# Patient Record
Sex: MALE | Race: WHITE | NOT HISPANIC OR LATINO | Employment: FULL TIME | URBAN - METROPOLITAN AREA
[De-identification: names, ages, dates, MRNs, and addresses within clinical notes are randomized per-mention and may not be internally consistent; named-entity substitution may affect disease eponyms.]

---

## 2017-01-09 ENCOUNTER — OFFICE VISIT (OUTPATIENT)
Dept: FAMILY MEDICINE CLINIC | Facility: CLINIC | Age: 52
End: 2017-01-09

## 2017-01-09 VITALS
HEART RATE: 71 BPM | RESPIRATION RATE: 16 BRPM | TEMPERATURE: 98 F | OXYGEN SATURATION: 98 % | SYSTOLIC BLOOD PRESSURE: 150 MMHG | BODY MASS INDEX: 33.15 KG/M2 | WEIGHT: 266.6 LBS | HEIGHT: 75 IN | DIASTOLIC BLOOD PRESSURE: 94 MMHG

## 2017-01-09 DIAGNOSIS — I10 BENIGN ESSENTIAL HYPERTENSION: ICD-10-CM

## 2017-01-09 DIAGNOSIS — R73.9 HYPERGLYCEMIA: Primary | ICD-10-CM

## 2017-01-09 DIAGNOSIS — E11.65 TYPE 2 DIABETES MELLITUS WITH HYPERGLYCEMIA, WITHOUT LONG-TERM CURRENT USE OF INSULIN (HCC): ICD-10-CM

## 2017-01-09 LAB — HBA1C MFR BLD: 12.2 %

## 2017-01-09 PROCEDURE — 83036 HEMOGLOBIN GLYCOSYLATED A1C: CPT | Performed by: INTERNAL MEDICINE

## 2017-01-09 PROCEDURE — 99213 OFFICE O/P EST LOW 20 MIN: CPT | Performed by: INTERNAL MEDICINE

## 2017-01-09 RX ORDER — CARVEDILOL 12.5 MG/1
12.5 TABLET ORAL 2 TIMES DAILY WITH MEALS
Qty: 60 TABLET | Refills: 11 | Status: SHIPPED | OUTPATIENT
Start: 2017-01-09 | End: 2017-06-26 | Stop reason: HOSPADM

## 2017-01-09 NOTE — MR AVS SNAPSHOT
Jona ROJAS Ela   1/9/2017 8:30 AM   Office Visit    Dept Phone:  [phone]   Encounter #:  [id number]    Provider:  Elias Almendarez MD   Department:  [facility]                Your Full Care Plan              Today's Medication Changes          These changes are accurate as of: 1/9/17  9:24 AM.  If you have any questions, ask your nurse or doctor.               New Medication(s)Ordered:     Dulaglutide 1.5 MG/0.5ML solution pen-injector   Inject 1.5 mg under the skin 1 (One) Time Per Week.   Started by:  Elias Almendarez MD       SITagliptin-MetFORMIN HCl -1000 MG tablet sustained-release 24 hour   Commonly known as:  JANUMET XR   one tablet daily   Started by:  Elias Almendarez MD         Medication(s)that have changed:     carvedilol 12.5 MG tablet   Commonly known as:  COREG   Take 1 tablet by mouth 2 (Two) Times a Day With Meals.   What changed:  See the new instructions.   Changed by:  Elias Almendarez MD            Where to Get Your Medications      These medications were sent to [place] [id number] [facility] [phone] [facility] [phone] [address] [address]     Phone:  [phone]     carvedilol 12.5 MG tablet    Dulaglutide 1.5 MG/0.5ML solution pen-injector    SITagliptin-MetFORMIN HCl -1000 MG tablet sustained-release 24 hour                  Your Updated Medication List          This list is accurate as of: 1/9/17  9:24 AM.  Always use your most recent med list.                amLODIPine 5 MG tablet   Commonly known as:  NORVASC   Take 1 tablet by mouth Daily.       atorvastatin 10 MG tablet   Commonly known as:  LIPITOR       carvedilol 12.5 MG tablet   Commonly known as:  COREG   Take 1 tablet by mouth 2 (Two) Times a Day With Meals.       Dulaglutide 1.5 MG/0.5ML solution pen-injector   Inject 1.5 mg under the skin 1 (One) Time Per  Week.       FLUVIRIN 0.5 ML suspension prefilled syringe injection   Generic drug:  Influenza Vac Typ A&B Surf Ant       hydrOXYzine 25 MG tablet   Commonly known as:  ATARAX   Take 1 tablet by mouth every 6 (six) hours as needed for itching.       lisinopril-hydrochlorothiazide 20-25 MG per tablet   Commonly known as:  PRINZIDE,ZESTORETIC   TAKE ONE TABLET BY MOUTH ONCE DAILY       SITagliptin-MetFORMIN HCl -1000 MG tablet sustained-release 24 hour   Commonly known as:  JANUMET XR   one tablet daily       Testosterone 20.25 MG/ACT (1.62%) gel   Commonly known as:  ANDROGEL PUMP   APPLY FOUR PUMPS TO THE SKIN ONCE DAILY AS DIRECTED               We Performed the Following     POC Glycosylated Hemoglobin (Hb A1C)       You Were Diagnosed With        Codes Comments    Hyperglycemia    -  Primary ICD-10-CM: R73.9  ICD-9-CM: 790.29       Instructions     None    Patient Instructions History      Upcoming Appointments     Visit Type Date Time Department    OFFICE VISIT 1/9/2017  8:30 AM [facility]      [employer] Signup     Our records indicate that you have an active [employer] account.    You can view your After Visit Summary by going to [email or web address] and logging in with your [employer] username and password.  If you don't have a [employer] username and password but a parent or guardian has access to your record, the parent or guardian should login with their own [employer] username and password and access your record to view the After Visit Summary.    If you have questions, you can email [email or web address] or call [phone] to talk to our [employer] staff.  Remember, [employer] is NOT to be used for urgent needs.  For medical emergencies, dial 911.               Other Info from Your Visit           Allergies     No Known Allergies      Reason for Visit     Hypogonadism 1 month f/u       Vital Signs     Blood Pressure Pulse Temperature Respirations Height Weight    150/94 71 98 °F (36.7  "°C) 16 75\" (190.5 cm) 266 lb 9.6 oz (121 kg)    Oxygen Saturation Body Mass Index Smoking Status             98% 33.32 kg/m2 Never Smoker         Problems and Diagnoses Noted     Hyperglycemia    -  Primary      Results     POC Glycosylated Hemoglobin (Hb A1C)      Component Value Standard Range & Units    Hemoglobin A1C 12.2 %                    "

## 2017-01-09 NOTE — PROGRESS NOTES
Subjective   Jona Mahmood is a 51 y.o. male. Patient is here today for   Chief Complaint   Patient presents with   • Hypogonadism     1 month f/u           Vitals:    01/09/17 0827   BP: 150/94   Pulse: 71   Resp: 16   Temp: 98 °F (36.7 °C)   SpO2: 98%       Past Medical History   Diagnosis Date   • Hyperglycemia    • Hypertension    • Low testosterone       No Known Allergies   Social History     Social History   • Marital status:      Spouse name: N/A   • Number of children: N/A   • Years of education: N/A     Occupational History   • Not on file.     Social History Main Topics   • Smoking status: Never Smoker   • Smokeless tobacco: Not on file   • Alcohol use No   • Drug use: Not on file   • Sexual activity: Not on file     Other Topics Concern   • Not on file     Social History Narrative        Current Outpatient Prescriptions:   •  amLODIPine (NORVASC) 5 MG tablet, Take 1 tablet by mouth Daily., Disp: 30 tablet, Rfl: 5  •  atorvastatin (LIPITOR) 10 MG tablet, Take 1 tablet by mouth daily., Disp: , Rfl:   •  FLUVIRIN 0.5 ML suspension prefilled syringe injection, , Disp: , Rfl:   •  hydrOXYzine (ATARAX) 25 MG tablet, Take 1 tablet by mouth every 6 (six) hours as needed for itching., Disp: 40 tablet, Rfl: 0  •  lisinopril-hydrochlorothiazide (PRINZIDE,ZESTORETIC) 20-25 MG per tablet, TAKE ONE TABLET BY MOUTH ONCE DAILY, Disp: 90 tablet, Rfl: 0  •  Testosterone (ANDROGEL PUMP) 20.25 MG/ACT (1.62%) gel, APPLY FOUR PUMPS TO THE SKIN ONCE DAILY AS DIRECTED, Disp: 75 g, Rfl: 1  •  carvedilol (COREG) 12.5 MG tablet, Take 1 tablet by mouth 2 (Two) Times a Day With Meals., Disp: 60 tablet, Rfl: 11  •  Dulaglutide 1.5 MG/0.5ML solution pen-injector, Inject 1.5 mg under the skin 1 (One) Time Per Week., Disp: 4 pen, Rfl: 11  •  SITagliptin-MetFORMIN HCl ER (JANUMET XR) 100-1000 MG tablet sustained-release 24 hour, one tablet daily, Disp: 30 tablet, Rfl: 11     Objective     HPI Comments: He is here to follow-up on  his type 2 diabetes.  He has been taking Janumet 100/1000 once daily since a saw him last.    He tells me that he feels pretty well.  He denies polyuria polydipsia or visual changes.       Review of Systems   Constitutional: Negative.    HENT: Negative.    Respiratory: Negative.    Cardiovascular: Negative.    Genitourinary: Negative for frequency and urgency.   Psychiatric/Behavioral: Negative.        Physical Exam   Constitutional: He is oriented to person, place, and time.   HENT:   Head: Normocephalic and atraumatic.   Pulmonary/Chest: Effort normal.   Neurological: He is alert and oriented to person, place, and time.   Psychiatric: He has a normal mood and affect. His behavior is normal.   Nursing note and vitals reviewed.        Problem List Items Addressed This Visit        Cardiovascular and Mediastinum    Benign essential hypertension    Relevant Medications    carvedilol (COREG) 12.5 MG tablet       Endocrine    Type 2 diabetes mellitus    Relevant Medications    Dulaglutide 1.5 MG/0.5ML solution pen-injector    SITagliptin-MetFORMIN HCl ER (JANUMET XR) 100-1000 MG tablet sustained-release 24 hour      Other Visit Diagnoses     Hyperglycemia    -  Primary    Relevant Orders    POC Glycosylated Hemoglobin (Hb A1C) (Completed)            PLAN  His hemoglobin A1c today is more than 12 which is a slight increase over a hemoglobin A1c done last month.  This, and spite of taking Janumet 100/1000 once daily.    I asked him to start taking 2 was 71.5 mg subcutaneous every weekly.    I like to see him back in about one month.  I would check a hemoglobin A1c at that point.    I may be sending him off to see an endocrinologist before I started him on insulin.  The patient and I discussed referral to endocrinology.  I asked him to wait a month to see what sort of progress he makes in the meantime.  Next    His hypertension is poorly controlled.  I asked him to start taking carvedilol 12.5 mg one by mouth twice a  day.  Return in about 4 weeks (around 2/6/2017).

## 2017-02-07 ENCOUNTER — OFFICE VISIT (OUTPATIENT)
Dept: FAMILY MEDICINE CLINIC | Facility: CLINIC | Age: 52
End: 2017-02-07

## 2017-02-07 VITALS
BODY MASS INDEX: 32.58 KG/M2 | SYSTOLIC BLOOD PRESSURE: 122 MMHG | DIASTOLIC BLOOD PRESSURE: 80 MMHG | TEMPERATURE: 98.3 F | RESPIRATION RATE: 16 BRPM | OXYGEN SATURATION: 98 % | HEART RATE: 74 BPM | HEIGHT: 75 IN | WEIGHT: 262 LBS

## 2017-02-07 DIAGNOSIS — E11.65 TYPE 2 DIABETES MELLITUS WITH HYPERGLYCEMIA, WITHOUT LONG-TERM CURRENT USE OF INSULIN (HCC): Primary | ICD-10-CM

## 2017-02-07 DIAGNOSIS — I10 BENIGN ESSENTIAL HYPERTENSION: ICD-10-CM

## 2017-02-07 PROCEDURE — 99213 OFFICE O/P EST LOW 20 MIN: CPT | Performed by: INTERNAL MEDICINE

## 2017-02-07 NOTE — PROGRESS NOTES
Subjective   Jona Mahmood is a 52 y.o. male. Patient is here today for   Chief Complaint   Patient presents with   • Hypertension     f/u blood pressure and high blood sugar   • Hyperglycemia          Vitals:    02/07/17 0805   BP: 122/80   Pulse: 74   Resp: 16   Temp: 98.3 °F (36.8 °C)   SpO2: 98%       Past Medical History   Diagnosis Date   • Hyperglycemia    • Hypertension    • Low testosterone       No Known Allergies   Social History     Social History   • Marital status:      Spouse name: N/A   • Number of children: N/A   • Years of education: N/A     Occupational History   • Not on file.     Social History Main Topics   • Smoking status: Never Smoker   • Smokeless tobacco: Not on file   • Alcohol use No   • Drug use: Not on file   • Sexual activity: Not on file     Other Topics Concern   • Not on file     Social History Narrative        Current Outpatient Prescriptions:   •  amLODIPine (NORVASC) 5 MG tablet, Take 1 tablet by mouth Daily., Disp: 30 tablet, Rfl: 5  •  atorvastatin (LIPITOR) 10 MG tablet, Take 1 tablet by mouth daily., Disp: , Rfl:   •  carvedilol (COREG) 12.5 MG tablet, Take 1 tablet by mouth 2 (Two) Times a Day With Meals., Disp: 60 tablet, Rfl: 11  •  Dulaglutide 1.5 MG/0.5ML solution pen-injector, Inject 1.5 mg under the skin 1 (One) Time Per Week., Disp: 4 pen, Rfl: 11  •  FLUVIRIN 0.5 ML suspension prefilled syringe injection, , Disp: , Rfl:   •  hydrOXYzine (ATARAX) 25 MG tablet, Take 1 tablet by mouth every 6 (six) hours as needed for itching., Disp: 40 tablet, Rfl: 0  •  lisinopril-hydrochlorothiazide (PRINZIDE,ZESTORETIC) 20-25 MG per tablet, TAKE ONE TABLET BY MOUTH ONCE DAILY, Disp: 90 tablet, Rfl: 0  •  SITagliptin-MetFORMIN HCl ER (JANUMET XR) 100-1000 MG tablet sustained-release 24 hour, one tablet daily, Disp: 30 tablet, Rfl: 11  •  Testosterone (ANDROGEL PUMP) 20.25 MG/ACT (1.62%) gel, APPLY FOUR PUMPS TO THE SKIN ONCE DAILY AS DIRECTED, Disp: 75 g, Rfl: 1      Objective     HPI Comments: He is here today to follow-up on his type 2 diabetes and hypertension he's been checking his blood sugars and he finds that he never gets a fasting glucose greater than 130 since a saw him last.  He tells me that he feels well.    Hypertension     Hyperglycemia          Review of Systems   Constitutional: Negative.    HENT: Negative.    Eyes: Negative.    Respiratory: Negative.    Cardiovascular: Negative.    Gastrointestinal: Negative.    Psychiatric/Behavioral: Negative.        Physical Exam   Constitutional: He is oriented to person, place, and time. He appears well-developed and well-nourished.   HENT:   Head: Normocephalic and atraumatic.   Cardiovascular: Normal rate.    Pulmonary/Chest: Effort normal.   Musculoskeletal: Normal range of motion.   Neurological: He is alert and oriented to person, place, and time.   Psychiatric: He has a normal mood and affect. His behavior is normal.   Nursing note and vitals reviewed.        Problem List Items Addressed This Visit        Cardiovascular and Mediastinum    Benign essential hypertension       Endocrine    Type 2 diabetes mellitus - Primary            PLAN  She and I reviewed his daily fasting glucometer readings since he and I site to the last about 6 weeks ago.  He doesn't have any fasting glucose readings higher than 130, he has a few of them which are less than 90.    I believe he's got much better controlled his type 2 diabetes.  Like him to come back in about 2 months fasting Labs about a week before.    His hypertension is much better controlled.    No Follow-up on file.

## 2017-03-07 RX ORDER — LISINOPRIL AND HYDROCHLOROTHIAZIDE 25; 20 MG/1; MG/1
TABLET ORAL
Qty: 90 TABLET | Refills: 0 | Status: SHIPPED | OUTPATIENT
Start: 2017-03-07

## 2017-03-21 RX ORDER — CARVEDILOL 6.25 MG/1
TABLET ORAL
Qty: 180 TABLET | Refills: 0 | Status: SHIPPED | OUTPATIENT
Start: 2017-03-21 | End: 2017-06-26 | Stop reason: HOSPADM

## 2017-03-28 RX ORDER — TESTOSTERONE 16.2 MG/G
GEL TRANSDERMAL
Qty: 75 G | Refills: 2 | Status: SHIPPED | OUTPATIENT
Start: 2017-03-28 | End: 2017-03-31 | Stop reason: SDUPTHER

## 2017-03-29 ENCOUNTER — TELEPHONE (OUTPATIENT)
Dept: FAMILY MEDICINE CLINIC | Facility: CLINIC | Age: 52
End: 2017-03-29

## 2017-03-29 NOTE — TELEPHONE ENCOUNTER
SPOKE TO PATIENT AND INFORMED HIM THAT THE PRESCRIPTION WAS READY FOR    ----- Message from Sina Mahan sent at 3/28/2017  1:36 PM EDT -----  Contact: PT'S WIFE  PT NEEDS NEW WRITTEN RX FOR HIS TESTOSTERONE (ANDROGEL PUMP) 20.25 MG/ACT APPLY 4 PUMPS TO SKIN ONCE DAILY.     PT'S # [id number]

## 2017-03-31 ENCOUNTER — TELEPHONE (OUTPATIENT)
Dept: FAMILY MEDICINE CLINIC | Facility: CLINIC | Age: 52
End: 2017-03-31

## 2017-03-31 RX ORDER — TESTOSTERONE 16.2 MG/G
GEL TRANSDERMAL
Qty: 225 G | Refills: 2 | Status: SHIPPED | OUTPATIENT
Start: 2017-03-31 | End: 2017-08-14 | Stop reason: SDUPTHER

## 2017-03-31 NOTE — TELEPHONE ENCOUNTER
SPOKE TO PATIENT AND LEARNED THAT HE TAKES 75 G EVERY 15 DAYS SO I CALLED THE PHARMACY AND EXPLAINED THAT HE ACTUALLY NEED 150 G DISPENSED FOR A THIRTY DAY SUPPLY WITH TWO REFILLS, PHARMACY UNDERSTOOD AND PATIENT WAS NOTIFIED THAT THE CHANGE WAS MADE   ----- Message from Kelsy Wallace sent at 3/31/2017 10:07 AM EDT -----  PTS WIFE CALLED SAID THAT ALBARO FONG WAS ONLY WRITTEN FOR ONE TUBE AND THEY PAY $30 NO MATTER IF THEY GET 1, 2, OR 3 BUT IT BECOME COSTLY IF THEY PAY $30 FOR ONLY 1 AT A TIME. THEY WERE WONDERING IF THEY COULD GET A 90 DAY SUPPLY. PLEASE CALL PT WITH ANY QUESTIONS.    PT# [phone]

## 2017-04-06 ENCOUNTER — OFFICE VISIT (OUTPATIENT)
Dept: FAMILY MEDICINE CLINIC | Facility: CLINIC | Age: 52
End: 2017-04-06

## 2017-04-06 VITALS
BODY MASS INDEX: 31.83 KG/M2 | TEMPERATURE: 98.4 F | OXYGEN SATURATION: 97 % | DIASTOLIC BLOOD PRESSURE: 80 MMHG | HEART RATE: 70 BPM | HEIGHT: 75 IN | SYSTOLIC BLOOD PRESSURE: 138 MMHG | WEIGHT: 256 LBS | RESPIRATION RATE: 16 BRPM

## 2017-04-06 DIAGNOSIS — I10 BENIGN ESSENTIAL HYPERTENSION: ICD-10-CM

## 2017-04-06 DIAGNOSIS — E11.9 TYPE 2 DIABETES MELLITUS WITHOUT COMPLICATION, WITHOUT LONG-TERM CURRENT USE OF INSULIN (HCC): Primary | ICD-10-CM

## 2017-04-06 LAB — HBA1C MFR BLD: 5.9 %

## 2017-04-06 PROCEDURE — 99213 OFFICE O/P EST LOW 20 MIN: CPT | Performed by: INTERNAL MEDICINE

## 2017-04-06 PROCEDURE — 83036 HEMOGLOBIN GLYCOSYLATED A1C: CPT | Performed by: INTERNAL MEDICINE

## 2017-04-06 PROCEDURE — 36416 COLLJ CAPILLARY BLOOD SPEC: CPT | Performed by: INTERNAL MEDICINE

## 2017-04-06 NOTE — PROGRESS NOTES
Donell Mahmood is a 52 y.o. male. Patient is here today for   Chief Complaint   Patient presents with   • Hyperlipidemia     2 month f/u   • Diabetes          Vitals:    04/06/17 0805   BP: 138/80   Pulse: 70   Resp: 16   Temp: 98.4 °F (36.9 °C)   SpO2: 97%       Past Medical History:   Diagnosis Date   • Hyperglycemia    • Hypertension    • Low testosterone       No Known Allergies   Social History     Social History   • Marital status:      Spouse name: N/A   • Number of children: N/A   • Years of education: N/A     Occupational History   • Not on file.     Social History Main Topics   • Smoking status: Never Smoker   • Smokeless tobacco: Not on file   • Alcohol use No   • Drug use: Not on file   • Sexual activity: Not on file     Other Topics Concern   • Not on file     Social History Narrative        Current Outpatient Prescriptions:   •  amLODIPine (NORVASC) 5 MG tablet, Take 1 tablet by mouth Daily., Disp: 30 tablet, Rfl: 5  •  atorvastatin (LIPITOR) 10 MG tablet, Take 1 tablet by mouth daily., Disp: , Rfl:   •  carvedilol (COREG) 12.5 MG tablet, Take 1 tablet by mouth 2 (Two) Times a Day With Meals., Disp: 60 tablet, Rfl: 11  •  carvedilol (COREG) 6.25 MG tablet, TAKE ONE TABLET BY MOUTH TWICE DAILY, Disp: 180 tablet, Rfl: 0  •  Dulaglutide 1.5 MG/0.5ML solution pen-injector, Inject 1.5 mg under the skin 1 (One) Time Per Week., Disp: 4 pen, Rfl: 11  •  FLUVIRIN 0.5 ML suspension prefilled syringe injection, , Disp: , Rfl:   •  hydrOXYzine (ATARAX) 25 MG tablet, Take 1 tablet by mouth every 6 (six) hours as needed for itching., Disp: 40 tablet, Rfl: 0  •  lisinopril-hydrochlorothiazide (PRINZIDE,ZESTORETIC) 20-25 MG per tablet, TAKE ONE TABLET BY MOUTH ONCE DAILY, Disp: 90 tablet, Rfl: 0  •  SITagliptin-MetFORMIN HCl ER (JANUMET XR) 100-1000 MG tablet sustained-release 24 hour, one tablet daily, Disp: 30 tablet, Rfl: 11  •  Testosterone (ANDROGEL PUMP) 20.25 MG/ACT (1.62%) gel, APPLY FOUR  PUMPS TO SKIN ONCE DAILY, Disp: 225 g, Rfl: 2     Objective     HPI Comments: He is here to follow-up on his type 2 diabetes.  He tells me that he's feeling well.  He is lost a few pounds since a saw him last.    When he was here 2 months ago, I started him on Trulicity.  He has changed his diet to be more appropriate.    Hyperlipidemia     Diabetes          Review of Systems   Constitutional: Negative.    HENT: Negative.    Respiratory: Negative.    Cardiovascular: Negative.    Psychiatric/Behavioral: Negative.        Physical Exam   Constitutional: He is oriented to person, place, and time. He appears well-developed and well-nourished.   HENT:   Head: Normocephalic and atraumatic.   Pulmonary/Chest: Effort normal.   Neurological: He is alert and oriented to person, place, and time.   Psychiatric: He has a normal mood and affect. His behavior is normal.   Nursing note and vitals reviewed.        Problem List Items Addressed This Visit        Cardiovascular and Mediastinum    Benign essential hypertension       Endocrine    Type 2 diabetes mellitus - Primary    Relevant Orders    POC Glycosylated Hemoglobin (Hb A1C) (Completed)            PLAN  A hemoglobin A1c was 5.9 today.  This reflects excellent control of his type 2 diabetes.    His hypertension is well-controlled.    I asked him to come back in about 3 months.  I will like to check a lipid profile, comprehensive metabolic panel, hemoglobin A 1C, and urine microalbumin at that time.  No Follow-up on file.

## 2017-06-24 ENCOUNTER — APPOINTMENT (OUTPATIENT)
Dept: GENERAL RADIOLOGY | Facility: HOSPITAL | Age: 52
End: 2017-06-24

## 2017-06-24 ENCOUNTER — APPOINTMENT (OUTPATIENT)
Dept: ULTRASOUND IMAGING | Facility: HOSPITAL | Age: 52
End: 2017-06-24

## 2017-06-24 ENCOUNTER — HOSPITAL ENCOUNTER (OUTPATIENT)
Facility: HOSPITAL | Age: 52
Setting detail: OBSERVATION
Discharge: HOME | End: 2017-06-26
Attending: EMERGENCY MEDICINE | Admitting: SURGERY

## 2017-06-24 DIAGNOSIS — K81.0 ACUTE CHOLECYSTITIS: Primary | ICD-10-CM

## 2017-06-24 LAB
ALBUMIN SERPL-MCNC: 4.1 G/DL (ref 3.5–5.2)
ALBUMIN/GLOB SERPL: 1.3 G/DL
ALP SERPL-CCNC: 85 U/L (ref 39–117)
ALT SERPL W P-5'-P-CCNC: 24 U/L (ref 1–41)
ANION GAP SERPL CALCULATED.3IONS-SCNC: 15.6 MMOL/L
ANISOCYTOSIS BLD QL: NORMAL
AST SERPL-CCNC: 24 U/L (ref 1–40)
BASOPHILS # BLD AUTO: 0.02 10*3/MM3 (ref 0–0.2)
BASOPHILS NFR BLD AUTO: 0.2 % (ref 0–1.5)
BILIRUB SERPL-MCNC: 0.4 MG/DL (ref 0.1–1.2)
BILIRUB UR QL STRIP: NEGATIVE
BUN BLD-MCNC: 16 MG/DL (ref 6–20)
BUN/CREAT SERPL: 18 (ref 7–25)
CALCIUM SPEC-SCNC: 9.8 MG/DL (ref 8.6–10.5)
CHLORIDE SERPL-SCNC: 98 MMOL/L (ref 98–107)
CLARITY UR: CLEAR
CO2 SERPL-SCNC: 23.4 MMOL/L (ref 22–29)
COLOR UR: YELLOW
CREAT BLD-MCNC: 0.89 MG/DL (ref 0.76–1.27)
DACRYOCYTES BLD QL SMEAR: NORMAL
DEPRECATED RDW RBC AUTO: 37.3 FL (ref 37–54)
ELLIPTOCYTES BLD QL SMEAR: NORMAL
EOSINOPHIL # BLD AUTO: 0.17 10*3/MM3 (ref 0–0.7)
EOSINOPHIL NFR BLD AUTO: 1.6 % (ref 0.3–6.2)
ERYTHROCYTE [DISTWIDTH] IN BLOOD BY AUTOMATED COUNT: 15.2 % (ref 11.5–14.5)
GFR SERPL CREATININE-BSD FRML MDRD: 90 ML/MIN/1.73
GLOBULIN UR ELPH-MCNC: 3.2 GM/DL
GLUCOSE BLD-MCNC: 169 MG/DL (ref 65–99)
GLUCOSE UR STRIP-MCNC: NEGATIVE MG/DL
HCT VFR BLD AUTO: 43.5 % (ref 40.4–52.2)
HGB BLD-MCNC: 14.4 G/DL (ref 13.7–17.6)
HGB UR QL STRIP.AUTO: NEGATIVE
HOLD SPECIMEN: NORMAL
HYPOCHROMIA BLD QL: NORMAL
IMM GRANULOCYTES # BLD: 0.02 10*3/MM3 (ref 0–0.03)
IMM GRANULOCYTES NFR BLD: 0.2 % (ref 0–0.5)
KETONES UR QL STRIP: NEGATIVE
LEUKOCYTE ESTERASE UR QL STRIP.AUTO: NEGATIVE
LIPASE SERPL-CCNC: 31 U/L (ref 13–60)
LYMPHOCYTES # BLD AUTO: 2.38 10*3/MM3 (ref 0.9–4.8)
LYMPHOCYTES NFR BLD AUTO: 23 % (ref 19.6–45.3)
MCH RBC QN AUTO: 23 PG (ref 27–32.7)
MCHC RBC AUTO-ENTMCNC: 33.1 G/DL (ref 32.6–36.4)
MCV RBC AUTO: 69.5 FL (ref 79.8–96.2)
MONOCYTES # BLD AUTO: 0.54 10*3/MM3 (ref 0.2–1.2)
MONOCYTES NFR BLD AUTO: 5.2 % (ref 5–12)
NEUTROPHILS # BLD AUTO: 7.21 10*3/MM3 (ref 1.9–8.1)
NEUTROPHILS NFR BLD AUTO: 69.8 % (ref 42.7–76)
NITRITE UR QL STRIP: NEGATIVE
PH UR STRIP.AUTO: 6 [PH] (ref 5–8)
PLAT MORPH BLD: NORMAL
PLATELET # BLD AUTO: 184 10*3/MM3 (ref 140–500)
PMV BLD AUTO: 9.7 FL (ref 6–12)
POTASSIUM BLD-SCNC: 3.3 MMOL/L (ref 3.5–5.2)
PROT SERPL-MCNC: 7.3 G/DL (ref 6–8.5)
PROT UR QL STRIP: NEGATIVE
RBC # BLD AUTO: 6.26 10*6/MM3 (ref 4.6–6)
SODIUM BLD-SCNC: 137 MMOL/L (ref 136–145)
SP GR UR STRIP: 1.02 (ref 1–1.03)
TROPONIN T SERPL-MCNC: <0.01 NG/ML (ref 0–0.03)
UROBILINOGEN UR QL STRIP: NORMAL
WBC MORPH BLD: NORMAL
WBC NRBC COR # BLD: 10.34 10*3/MM3 (ref 4.5–10.7)
WHOLE BLOOD HOLD SPECIMEN: NORMAL

## 2017-06-24 PROCEDURE — 25010000002 PIPERACILLIN SOD-TAZOBACTAM PER 1 G: Performed by: EMERGENCY MEDICINE

## 2017-06-24 PROCEDURE — G0378 HOSPITAL OBSERVATION PER HR: HCPCS

## 2017-06-24 PROCEDURE — 93010 ELECTROCARDIOGRAM REPORT: CPT | Performed by: INTERNAL MEDICINE

## 2017-06-24 PROCEDURE — 36415 COLL VENOUS BLD VENIPUNCTURE: CPT | Performed by: EMERGENCY MEDICINE

## 2017-06-24 PROCEDURE — 85007 BL SMEAR W/DIFF WBC COUNT: CPT | Performed by: EMERGENCY MEDICINE

## 2017-06-24 PROCEDURE — 83690 ASSAY OF LIPASE: CPT | Performed by: EMERGENCY MEDICINE

## 2017-06-24 PROCEDURE — 96365 THER/PROPH/DIAG IV INF INIT: CPT

## 2017-06-24 PROCEDURE — 76705 ECHO EXAM OF ABDOMEN: CPT

## 2017-06-24 PROCEDURE — 81003 URINALYSIS AUTO W/O SCOPE: CPT | Performed by: EMERGENCY MEDICINE

## 2017-06-24 PROCEDURE — 80053 COMPREHEN METABOLIC PANEL: CPT | Performed by: EMERGENCY MEDICINE

## 2017-06-24 PROCEDURE — 84484 ASSAY OF TROPONIN QUANT: CPT | Performed by: EMERGENCY MEDICINE

## 2017-06-24 PROCEDURE — 99284 EMERGENCY DEPT VISIT MOD MDM: CPT

## 2017-06-24 PROCEDURE — 85025 COMPLETE CBC W/AUTO DIFF WBC: CPT | Performed by: EMERGENCY MEDICINE

## 2017-06-24 PROCEDURE — 25010000002 PROMETHAZINE PER 50 MG: Performed by: EMERGENCY MEDICINE

## 2017-06-24 PROCEDURE — 93005 ELECTROCARDIOGRAM TRACING: CPT

## 2017-06-24 PROCEDURE — 96372 THER/PROPH/DIAG INJ SC/IM: CPT

## 2017-06-24 PROCEDURE — 25010000002 HYDROMORPHONE PER 4 MG: Performed by: EMERGENCY MEDICINE

## 2017-06-24 PROCEDURE — 71020 HC CHEST PA AND LATERAL: CPT

## 2017-06-24 RX ORDER — LEVOTHYROXINE SODIUM 0.05 MG/1
50 TABLET ORAL
COMMUNITY

## 2017-06-24 RX ORDER — SODIUM CHLORIDE 0.9 % (FLUSH) 0.9 %
10 SYRINGE (ML) INJECTION AS NEEDED
Status: DISCONTINUED | OUTPATIENT
Start: 2017-06-24 | End: 2017-06-26 | Stop reason: HOSPADM

## 2017-06-24 RX ORDER — METFORMIN HYDROCHLORIDE 500 MG/1
500 TABLET, EXTENDED RELEASE ORAL
COMMUNITY

## 2017-06-24 RX ORDER — PROMETHAZINE HYDROCHLORIDE 25 MG/ML
25 INJECTION, SOLUTION INTRAMUSCULAR; INTRAVENOUS ONCE
Status: COMPLETED | OUTPATIENT
Start: 2017-06-24 | End: 2017-06-24

## 2017-06-24 RX ADMIN — TAZOBACTAM SODIUM AND PIPERACILLIN SODIUM 4.5 G: 500; 4 INJECTION, SOLUTION INTRAVENOUS at 22:50

## 2017-06-24 RX ADMIN — HYDROMORPHONE HYDROCHLORIDE 1 MG: 1 INJECTION, SOLUTION INTRAMUSCULAR; INTRAVENOUS; SUBCUTANEOUS at 20:36

## 2017-06-24 RX ADMIN — PROMETHAZINE HYDROCHLORIDE 25 MG: 25 INJECTION INTRAMUSCULAR; INTRAVENOUS at 20:38

## 2017-06-24 NOTE — ED NOTES
Pt given urine cup and asked to provide sample per MORENO Davila.     Merlyn Cummings, DANIELE  06/24/17 1932

## 2017-06-24 NOTE — ED PROVIDER NOTES
" EMERGENCY DEPARTMENT ENCOUNTER    CHIEF COMPLAINT  Chief Complaint: Abdominal Pain  History given by: Patient  History limited by:   Room Number: 13/13  PMD: Elias Almendarez MD      HPI:  Pt is a 52 y.o. male who presents complaining of epigastric abd pain that onset today at 1600. He describes the pain as a bruised sensation and states \"it feels like I've been punched.\" He states the pain is constant and denies any modifying factors. Pt rates the pain at 7/10. He has had similar sxs previously, which resolved spontaneously. He denies any fever, SOA, CP, cough, nausea, and vomiting, bladder/bowel sxs.    Duration: 4 hours  Onset: sudden  Timing: constant  Location: epigastrium  Radiation: none  Quality: bruised  Intensity/Severity: 7/10  Progression: unchanged  Associated Symptoms: none  Aggravating Factors: none  Alleviating Factors: none  Previous Episodes: similar episode resolved spontaneously  Treatment before arrival: none    PAST MEDICAL HISTORY  Active Ambulatory Problems     Diagnosis Date Noted   • Hypogonadism in male 01/20/2016   • Hypercholesterolemia 01/20/2016   • Type 2 diabetes mellitus 01/20/2016   • Reduced libido 01/20/2016   • Benign essential hypertension 01/20/2016     Resolved Ambulatory Problems     Diagnosis Date Noted   • No Resolved Ambulatory Problems     Past Medical History:   Diagnosis Date   • Diabetes mellitus    • Hyperglycemia    • Hypertension    • Low testosterone        PAST SURGICAL HISTORY  Past Surgical History:   Procedure Laterality Date   • ANKLE SURGERY     • HERNIA REPAIR         FAMILY HISTORY  Family History   Problem Relation Age of Onset   • COPD Mother    • Diabetes Father    • Hypertension Father        SOCIAL HISTORY  Social History     Social History   • Marital status:      Spouse name: N/A   • Number of children: N/A   • Years of education: N/A     Occupational History   • Not on file.     Social History Main Topics   • Smoking status: Former Smoker "   • Smokeless tobacco: Not on file      Comment: Quit 2000.   • Alcohol use No   • Drug use: Not on file   • Sexual activity: Not on file     Other Topics Concern   • Not on file     Social History Narrative   • No narrative on file       ALLERGIES  Review of patient's allergies indicates no known allergies.    REVIEW OF SYSTEMS  Review of Systems   Constitutional: Negative.  Negative for chills, fever and unexpected weight change.   HENT: Negative.    Respiratory: Negative.  Negative for shortness of breath.    Cardiovascular: Negative.  Negative for chest pain.   Gastrointestinal: Positive for abdominal pain. Negative for blood in stool, diarrhea, nausea and vomiting.   Genitourinary: Negative.  Negative for difficulty urinating and dysuria.   Musculoskeletal: Negative.    All other systems reviewed and are negative.      PHYSICAL EXAM  ED Triage Vitals   Temp Heart Rate Resp BP SpO2   06/24/17 1903 06/24/17 1903 06/24/17 1903 06/24/17 1906 06/24/17 1903   96.1 °F (35.6 °C) 65 18 167/87 100 %      Temp src Heart Rate Source Patient Position BP Location FiO2 (%)   06/24/17 1903 -- 06/24/17 1906 06/24/17 1906 --   Tympanic  Sitting Right arm        Physical Exam   Constitutional: He is oriented to person, place, and time and well-developed, well-nourished, and in no distress.   HENT:   Head: Normocephalic and atraumatic.   Eyes: EOM are normal. Pupils are equal, round, and reactive to light.   Neck: Normal range of motion. Neck supple.   Cardiovascular: Normal rate, regular rhythm and normal heart sounds.    Pulmonary/Chest: Effort normal and breath sounds normal. No respiratory distress.   Abdominal: Soft. There is tenderness ( moderate) in the right upper quadrant. There is no rebound and no guarding.   Musculoskeletal: Normal range of motion. He exhibits no edema.   Neurological: He is alert and oriented to person, place, and time. He has normal sensation and normal strength.   Skin: Skin is warm and dry.    Psychiatric: Mood and affect normal.   Nursing note and vitals reviewed.      LAB RESULTS  Lab Results (last 24 hours)     Procedure Component Value Units Date/Time    CBC & Differential [[id number]] Collected:  06/24/17 1924    Specimen:  Blood Updated:  06/24/17 1957    Narrative:       The following orders were created for panel order CBC & Differential.  Procedure                               Abnormality         Status                     ---------                               -----------         ------                     Scan Slide[[id number]]                                       Final result               CBC Auto Differential[[id number]]        Abnormal            Final result                 Please view results for these tests on the individual orders.    Comprehensive Metabolic Panel [[id number]]  (Abnormal) Collected:  06/24/17 1924    Specimen:  Blood Updated:  06/24/17 1959     Glucose 169 (H) mg/dL      BUN 16 mg/dL      Creatinine 0.89 mg/dL      Sodium 137 mmol/L      Potassium 3.3 (L) mmol/L      Chloride 98 mmol/L      CO2 23.4 mmol/L      Calcium 9.8 mg/dL      Total Protein 7.3 g/dL      Albumin 4.10 g/dL      ALT (SGPT) 24 U/L      AST (SGOT) 24 U/L      Alkaline Phosphatase 85 U/L      Total Bilirubin 0.4 mg/dL      eGFR Non African Amer 90 mL/min/1.73      Globulin 3.2 gm/dL      A/G Ratio 1.3 g/dL      BUN/Creatinine Ratio 18.0     Anion Gap 15.6 mmol/L     Lipase [[id number]]  (Normal) Collected:  06/24/17 1924    Specimen:  Blood Updated:  06/24/17 1959     Lipase 31 U/L     Troponin [[id number]]  (Normal) Collected:  06/24/17 1924    Specimen:  Blood Updated:  06/24/17 1959     Troponin T <0.010 ng/mL     Narrative:       Troponin T Reference Ranges:  Less than 0.03 ng/mL:    Negative for AMI  0.03 to 0.09 ng/mL:      Indeterminant for AMI  Greater than 0.09 ng/mL: Positive for AMI    CBC Auto Differential [[id number]]  (Abnormal) Collected:  06/24/17 1924    Specimen:  Blood Updated:   06/24/17 1957     WBC 10.34 10*3/mm3      RBC 6.26 (H) 10*6/mm3      Hemoglobin 14.4 g/dL      Hematocrit 43.5 %      MCV 69.5 (L) fL      MCH 23.0 (L) pg      MCHC 33.1 g/dL      RDW 15.2 (H) %      RDW-SD 37.3 fl      MPV 9.7 fL      Platelets 184 10*3/mm3      Neutrophil % 69.8 %      Lymphocyte % 23.0 %      Monocyte % 5.2 %      Eosinophil % 1.6 %      Basophil % 0.2 %      Immature Grans % 0.2 %      Neutrophils, Absolute 7.21 10*3/mm3      Lymphocytes, Absolute 2.38 10*3/mm3      Monocytes, Absolute 0.54 10*3/mm3      Eosinophils, Absolute 0.17 10*3/mm3      Basophils, Absolute 0.02 10*3/mm3      Immature Grans, Absolute 0.02 10*3/mm3     Scan Slide [[id number]] Collected:  06/24/17 1924    Specimen:  Blood Updated:  06/24/17 1957     Anisocytosis Slight/1+     Dacrocytes Slight/1+     Elliptocytes Slight/1+     Hypochromia Mod/2+     WBC Morphology Normal     Platelet Morphology Normal    Urinalysis With / Culture If Indicated [[id number]]  (Normal) Collected:  06/24/17 1942    Specimen:  Urine from Urine, Clean Catch Updated:  06/24/17 2001     Color, UA Yellow     Appearance, UA Clear     pH, UA 6.0     Specific Gravity, UA 1.017     Glucose, UA Negative     Ketones, UA Negative     Bilirubin, UA Negative     Blood, UA Negative     Protein, UA Negative     Leuk Esterase, UA Negative     Nitrite, UA Negative     Urobilinogen, UA 0.2 E.U./dL    Narrative:       Urine microscopic not indicated.          I ordered the above labs and reviewed the results    RADIOLOGY  US Gallbladder   Final Result   Cholelithiasis with gallbladder sludge and pericholecystic   fluid. The findings would be concerning for acute cholecystitis in the   proper clinical setting.       This report was finalized on 6/24/2017 9:10 PM by Roly Townsend MD.          XR Chest 2 View   Final Result   1. No active disease.       This report was finalized on 6/24/2017 8:43 PM by Roly Townsend MD.                 I ordered the above noted  radiological studies. Interpreted by radiologist. Reviewed by me in PACS.       PROCEDURES  Procedures  EKG           EKG time: 2006  Rhythm/Rate: NSR, 62BPM  P waves and CT: nml  QRS, axis: nml   ST and T waves: nml     Interpreted Contemporaneously by me, independently viewed  No prior EKG for comparison       PROGRESS AND CONSULTS  ED Course   8:21 PM  Ordered gallbladder US for further evaluation. Dilaudid/Phenergan ordered for pain.   9:07 PM  Discussed pt with Dr. Oconnor. Dr. Oconnor is currently in OR and will call back. Discussed with the pt about his US result and option for admission. Pt understands and agrees with plan. All concerns addressed.   10:36 PM  Respoke with Dr. Oconnor who agrees to admit the pt to med/surg. Informed the pt of plan to admit. Will start on Zosyn.     MEDICAL DECISION MAKING      MDM  Number of Diagnoses or Management Options         DIAGNOSIS  Final diagnoses:   Acute cholecystitis       DISPOSITION  ADMISSION    Discussed treatment plan and reason for admission with pt/family and admitting physician.  Pt/family voiced understanding of the plan for admission for further testing/treatment as needed.         Latest Documented Vital Signs:  As of 10:37 PM  BP- 150/79 HR- 64 Temp- 96.1 °F (35.6 °C) (Tympanic) O2 sat- 98%    --  Documentation assistance provided by hanh Skinner for Dr. Brandon.  Information recorded by the scribe was done at my direction and has been verified and validated by me.     Theodore Skinner  06/24/17 2237       Berlin Brandon MD  06/24/17 2237

## 2017-06-25 ENCOUNTER — ANESTHESIA EVENT (OUTPATIENT)
Dept: PERIOP | Facility: HOSPITAL | Age: 52
End: 2017-06-25

## 2017-06-25 ENCOUNTER — APPOINTMENT (OUTPATIENT)
Dept: GENERAL RADIOLOGY | Facility: HOSPITAL | Age: 52
End: 2017-06-25

## 2017-06-25 ENCOUNTER — ANESTHESIA (OUTPATIENT)
Dept: PERIOP | Facility: HOSPITAL | Age: 52
End: 2017-06-25

## 2017-06-25 LAB
GLUCOSE BLDC GLUCOMTR-MCNC: 126 MG/DL (ref 70–130)
GLUCOSE BLDC GLUCOMTR-MCNC: 147 MG/DL (ref 70–130)
GLUCOSE BLDC GLUCOMTR-MCNC: 197 MG/DL (ref 70–130)

## 2017-06-25 PROCEDURE — 25010000002 SUCCINYLCHOLINE PER 20 MG: Performed by: ANESTHESIOLOGY

## 2017-06-25 PROCEDURE — 82962 GLUCOSE BLOOD TEST: CPT

## 2017-06-25 PROCEDURE — 96361 HYDRATE IV INFUSION ADD-ON: CPT

## 2017-06-25 PROCEDURE — G0378 HOSPITAL OBSERVATION PER HR: HCPCS

## 2017-06-25 PROCEDURE — 74300 X-RAY BILE DUCTS/PANCREAS: CPT

## 2017-06-25 PROCEDURE — 96376 TX/PRO/DX INJ SAME DRUG ADON: CPT

## 2017-06-25 PROCEDURE — 25010000002 KETOROLAC TROMETHAMINE PER 15 MG: Performed by: ANESTHESIOLOGY

## 2017-06-25 PROCEDURE — 25010000002 MIDAZOLAM PER 1 MG: Performed by: ANESTHESIOLOGY

## 2017-06-25 PROCEDURE — 25010000002 HYDROMORPHONE PER 4 MG: Performed by: SURGERY

## 2017-06-25 PROCEDURE — 25010000002 ONDANSETRON PER 1 MG: Performed by: SURGERY

## 2017-06-25 PROCEDURE — 25010000002 DEXAMETHASONE PER 1 MG: Performed by: ANESTHESIOLOGY

## 2017-06-25 PROCEDURE — 25010000002 PIPERACILLIN SOD-TAZOBACTAM PER 1 G: Performed by: SURGERY

## 2017-06-25 PROCEDURE — 47563 LAPARO CHOLECYSTECTOMY/GRAPH: CPT | Performed by: PHYSICIAN ASSISTANT

## 2017-06-25 PROCEDURE — 25010000002 PROPOFOL 10 MG/ML EMULSION: Performed by: ANESTHESIOLOGY

## 2017-06-25 PROCEDURE — 25010000002 FENTANYL CITRATE (PF) 100 MCG/2ML SOLUTION: Performed by: ANESTHESIOLOGY

## 2017-06-25 PROCEDURE — 25010000002 ONDANSETRON PER 1 MG: Performed by: ANESTHESIOLOGY

## 2017-06-25 PROCEDURE — S0260 H&P FOR SURGERY: HCPCS | Performed by: SURGERY

## 2017-06-25 PROCEDURE — 96375 TX/PRO/DX INJ NEW DRUG ADDON: CPT

## 2017-06-25 PROCEDURE — 0 IOTHALAMATE 60 % SOLUTION: Performed by: SURGERY

## 2017-06-25 PROCEDURE — 47563 LAPARO CHOLECYSTECTOMY/GRAPH: CPT | Performed by: SURGERY

## 2017-06-25 PROCEDURE — 88304 TISSUE EXAM BY PATHOLOGIST: CPT | Performed by: SURGERY

## 2017-06-25 PROCEDURE — 25010000002 HYDROMORPHONE PER 4 MG: Performed by: ANESTHESIOLOGY

## 2017-06-25 RX ORDER — DIPHENHYDRAMINE HYDROCHLORIDE 50 MG/ML
12.5 INJECTION INTRAMUSCULAR; INTRAVENOUS
Status: DISCONTINUED | OUTPATIENT
Start: 2017-06-25 | End: 2017-06-25 | Stop reason: HOSPADM

## 2017-06-25 RX ORDER — FENTANYL CITRATE 50 UG/ML
INJECTION, SOLUTION INTRAMUSCULAR; INTRAVENOUS AS NEEDED
Status: DISCONTINUED | OUTPATIENT
Start: 2017-06-25 | End: 2017-06-25 | Stop reason: SURG

## 2017-06-25 RX ORDER — LABETALOL HYDROCHLORIDE 5 MG/ML
5 INJECTION, SOLUTION INTRAVENOUS
Status: DISCONTINUED | OUTPATIENT
Start: 2017-06-25 | End: 2017-06-25 | Stop reason: HOSPADM

## 2017-06-25 RX ORDER — OXYCODONE AND ACETAMINOPHEN 7.5; 325 MG/1; MG/1
1 TABLET ORAL ONCE AS NEEDED
Status: DISCONTINUED | OUTPATIENT
Start: 2017-06-25 | End: 2017-06-25 | Stop reason: HOSPADM

## 2017-06-25 RX ORDER — FENTANYL CITRATE 50 UG/ML
INJECTION, SOLUTION INTRAMUSCULAR; INTRAVENOUS
Status: DISPENSED
Start: 2017-06-25 | End: 2017-06-25

## 2017-06-25 RX ORDER — HYDROMORPHONE HYDROCHLORIDE 1 MG/ML
0.5 INJECTION, SOLUTION INTRAMUSCULAR; INTRAVENOUS; SUBCUTANEOUS
Status: DISCONTINUED | OUTPATIENT
Start: 2017-06-25 | End: 2017-06-26 | Stop reason: HOSPADM

## 2017-06-25 RX ORDER — MAGNESIUM HYDROXIDE 1200 MG/15ML
LIQUID ORAL AS NEEDED
Status: DISCONTINUED | OUTPATIENT
Start: 2017-06-25 | End: 2017-06-25 | Stop reason: HOSPADM

## 2017-06-25 RX ORDER — FAMOTIDINE 10 MG/ML
20 INJECTION, SOLUTION INTRAVENOUS ONCE
Status: COMPLETED | OUTPATIENT
Start: 2017-06-25 | End: 2017-06-25

## 2017-06-25 RX ORDER — ONDANSETRON 2 MG/ML
INJECTION INTRAMUSCULAR; INTRAVENOUS AS NEEDED
Status: DISCONTINUED | OUTPATIENT
Start: 2017-06-25 | End: 2017-06-25 | Stop reason: SURG

## 2017-06-25 RX ORDER — SUCCINYLCHOLINE CHLORIDE 20 MG/ML
INJECTION INTRAMUSCULAR; INTRAVENOUS AS NEEDED
Status: DISCONTINUED | OUTPATIENT
Start: 2017-06-25 | End: 2017-06-25 | Stop reason: SURG

## 2017-06-25 RX ORDER — SODIUM CHLORIDE, SODIUM LACTATE, POTASSIUM CHLORIDE, CALCIUM CHLORIDE 600; 310; 30; 20 MG/100ML; MG/100ML; MG/100ML; MG/100ML
9 INJECTION, SOLUTION INTRAVENOUS CONTINUOUS
Status: DISCONTINUED | OUTPATIENT
Start: 2017-06-25 | End: 2017-06-25

## 2017-06-25 RX ORDER — ONDANSETRON 2 MG/ML
4 INJECTION INTRAMUSCULAR; INTRAVENOUS ONCE AS NEEDED
Status: DISCONTINUED | OUTPATIENT
Start: 2017-06-25 | End: 2017-06-25 | Stop reason: HOSPADM

## 2017-06-25 RX ORDER — SODIUM CHLORIDE 0.9 % (FLUSH) 0.9 %
1-10 SYRINGE (ML) INJECTION AS NEEDED
Status: DISCONTINUED | OUTPATIENT
Start: 2017-06-25 | End: 2017-06-25 | Stop reason: HOSPADM

## 2017-06-25 RX ORDER — PROMETHAZINE HYDROCHLORIDE 25 MG/ML
12.5 INJECTION, SOLUTION INTRAMUSCULAR; INTRAVENOUS ONCE AS NEEDED
Status: DISCONTINUED | OUTPATIENT
Start: 2017-06-25 | End: 2017-06-25 | Stop reason: HOSPADM

## 2017-06-25 RX ORDER — MIDAZOLAM HYDROCHLORIDE 1 MG/ML
INJECTION INTRAMUSCULAR; INTRAVENOUS
Status: DISPENSED
Start: 2017-06-25 | End: 2017-06-25

## 2017-06-25 RX ORDER — KETOROLAC TROMETHAMINE 30 MG/ML
INJECTION, SOLUTION INTRAMUSCULAR; INTRAVENOUS AS NEEDED
Status: DISCONTINUED | OUTPATIENT
Start: 2017-06-25 | End: 2017-06-25 | Stop reason: SURG

## 2017-06-25 RX ORDER — ONDANSETRON 2 MG/ML
4 INJECTION INTRAMUSCULAR; INTRAVENOUS EVERY 6 HOURS PRN
Status: DISCONTINUED | OUTPATIENT
Start: 2017-06-25 | End: 2017-06-26 | Stop reason: HOSPADM

## 2017-06-25 RX ORDER — MIDAZOLAM HYDROCHLORIDE 1 MG/ML
2 INJECTION INTRAMUSCULAR; INTRAVENOUS
Status: DISCONTINUED | OUTPATIENT
Start: 2017-06-25 | End: 2017-06-25 | Stop reason: HOSPADM

## 2017-06-25 RX ORDER — BUPIVACAINE HYDROCHLORIDE 2.5 MG/ML
INJECTION, SOLUTION EPIDURAL; INFILTRATION; INTRACAUDAL AS NEEDED
Status: DISCONTINUED | OUTPATIENT
Start: 2017-06-25 | End: 2017-06-25 | Stop reason: HOSPADM

## 2017-06-25 RX ORDER — HYDROMORPHONE HYDROCHLORIDE 1 MG/ML
0.5 INJECTION, SOLUTION INTRAMUSCULAR; INTRAVENOUS; SUBCUTANEOUS
Status: DISCONTINUED | OUTPATIENT
Start: 2017-06-25 | End: 2017-06-25 | Stop reason: HOSPADM

## 2017-06-25 RX ORDER — PROMETHAZINE HYDROCHLORIDE 25 MG/1
25 TABLET ORAL ONCE AS NEEDED
Status: DISCONTINUED | OUTPATIENT
Start: 2017-06-25 | End: 2017-06-25 | Stop reason: HOSPADM

## 2017-06-25 RX ORDER — PROMETHAZINE HYDROCHLORIDE 25 MG/1
12.5 TABLET ORAL ONCE AS NEEDED
Status: DISCONTINUED | OUTPATIENT
Start: 2017-06-25 | End: 2017-06-25 | Stop reason: HOSPADM

## 2017-06-25 RX ORDER — HYDRALAZINE HYDROCHLORIDE 20 MG/ML
5 INJECTION INTRAMUSCULAR; INTRAVENOUS
Status: DISCONTINUED | OUTPATIENT
Start: 2017-06-25 | End: 2017-06-25 | Stop reason: HOSPADM

## 2017-06-25 RX ORDER — LEVOTHYROXINE SODIUM 0.05 MG/1
50 TABLET ORAL
Status: DISCONTINUED | OUTPATIENT
Start: 2017-06-25 | End: 2017-06-26 | Stop reason: HOSPADM

## 2017-06-25 RX ORDER — EPHEDRINE SULFATE 50 MG/ML
5 INJECTION, SOLUTION INTRAVENOUS ONCE AS NEEDED
Status: DISCONTINUED | OUTPATIENT
Start: 2017-06-25 | End: 2017-06-25 | Stop reason: HOSPADM

## 2017-06-25 RX ORDER — LIDOCAINE HYDROCHLORIDE 20 MG/ML
INJECTION, SOLUTION INFILTRATION; PERINEURAL AS NEEDED
Status: DISCONTINUED | OUTPATIENT
Start: 2017-06-25 | End: 2017-06-25 | Stop reason: SURG

## 2017-06-25 RX ORDER — DEXTROSE, SODIUM CHLORIDE, AND POTASSIUM CHLORIDE 5; .45; .15 G/100ML; G/100ML; G/100ML
100 INJECTION INTRAVENOUS CONTINUOUS
Status: DISCONTINUED | OUTPATIENT
Start: 2017-06-25 | End: 2017-06-26

## 2017-06-25 RX ORDER — LISINOPRIL AND HYDROCHLOROTHIAZIDE 25; 20 MG/1; MG/1
1 TABLET ORAL
Status: DISCONTINUED | OUTPATIENT
Start: 2017-06-25 | End: 2017-06-26 | Stop reason: HOSPADM

## 2017-06-25 RX ORDER — MIDAZOLAM HYDROCHLORIDE 1 MG/ML
INJECTION INTRAMUSCULAR; INTRAVENOUS AS NEEDED
Status: DISCONTINUED | OUTPATIENT
Start: 2017-06-25 | End: 2017-06-25 | Stop reason: SURG

## 2017-06-25 RX ORDER — DEXTROSE MONOHYDRATE 25 G/50ML
25 INJECTION, SOLUTION INTRAVENOUS
Status: DISCONTINUED | OUTPATIENT
Start: 2017-06-25 | End: 2017-06-26 | Stop reason: HOSPADM

## 2017-06-25 RX ORDER — FLUMAZENIL 0.1 MG/ML
0.2 INJECTION INTRAVENOUS AS NEEDED
Status: DISCONTINUED | OUTPATIENT
Start: 2017-06-25 | End: 2017-06-25 | Stop reason: HOSPADM

## 2017-06-25 RX ORDER — ROCURONIUM BROMIDE 10 MG/ML
INJECTION, SOLUTION INTRAVENOUS AS NEEDED
Status: DISCONTINUED | OUTPATIENT
Start: 2017-06-25 | End: 2017-06-25 | Stop reason: SURG

## 2017-06-25 RX ORDER — FENTANYL CITRATE 50 UG/ML
50 INJECTION, SOLUTION INTRAMUSCULAR; INTRAVENOUS
Status: DISCONTINUED | OUTPATIENT
Start: 2017-06-25 | End: 2017-06-25 | Stop reason: HOSPADM

## 2017-06-25 RX ORDER — PROMETHAZINE HYDROCHLORIDE 25 MG/1
25 SUPPOSITORY RECTAL ONCE AS NEEDED
Status: DISCONTINUED | OUTPATIENT
Start: 2017-06-25 | End: 2017-06-25 | Stop reason: HOSPADM

## 2017-06-25 RX ORDER — HYDROCODONE BITARTRATE AND ACETAMINOPHEN 7.5; 325 MG/1; MG/1
1 TABLET ORAL EVERY 6 HOURS PRN
Status: DISCONTINUED | OUTPATIENT
Start: 2017-06-25 | End: 2017-06-26 | Stop reason: HOSPADM

## 2017-06-25 RX ORDER — PROPOFOL 10 MG/ML
VIAL (ML) INTRAVENOUS AS NEEDED
Status: DISCONTINUED | OUTPATIENT
Start: 2017-06-25 | End: 2017-06-25 | Stop reason: SURG

## 2017-06-25 RX ORDER — NALOXONE HCL 0.4 MG/ML
0.2 VIAL (ML) INJECTION AS NEEDED
Status: DISCONTINUED | OUTPATIENT
Start: 2017-06-25 | End: 2017-06-25 | Stop reason: HOSPADM

## 2017-06-25 RX ORDER — NICOTINE POLACRILEX 4 MG
15 LOZENGE BUCCAL
Status: DISCONTINUED | OUTPATIENT
Start: 2017-06-25 | End: 2017-06-26 | Stop reason: HOSPADM

## 2017-06-25 RX ORDER — MIDAZOLAM HYDROCHLORIDE 1 MG/ML
1 INJECTION INTRAMUSCULAR; INTRAVENOUS
Status: DISCONTINUED | OUTPATIENT
Start: 2017-06-25 | End: 2017-06-25 | Stop reason: HOSPADM

## 2017-06-25 RX ORDER — HYDROCODONE BITARTRATE AND ACETAMINOPHEN 7.5; 325 MG/1; MG/1
1 TABLET ORAL ONCE AS NEEDED
Status: DISCONTINUED | OUTPATIENT
Start: 2017-06-25 | End: 2017-06-25 | Stop reason: HOSPADM

## 2017-06-25 RX ORDER — DEXAMETHASONE SODIUM PHOSPHATE 10 MG/ML
INJECTION INTRAMUSCULAR; INTRAVENOUS AS NEEDED
Status: DISCONTINUED | OUTPATIENT
Start: 2017-06-25 | End: 2017-06-25 | Stop reason: SURG

## 2017-06-25 RX ADMIN — METOPROLOL TARTRATE 12.5 MG: 25 TABLET ORAL at 17:52

## 2017-06-25 RX ADMIN — HYDROMORPHONE HYDROCHLORIDE 0.5 MG: 1 INJECTION, SOLUTION INTRAMUSCULAR; INTRAVENOUS; SUBCUTANEOUS at 01:20

## 2017-06-25 RX ADMIN — MIDAZOLAM HYDROCHLORIDE 2 MG: 1 INJECTION, SOLUTION INTRAMUSCULAR; INTRAVENOUS at 09:24

## 2017-06-25 RX ADMIN — PROPOFOL 200 MG: 10 INJECTION, EMULSION INTRAVENOUS at 09:25

## 2017-06-25 RX ADMIN — ONDANSETRON 4 MG: 2 INJECTION INTRAMUSCULAR; INTRAVENOUS at 03:51

## 2017-06-25 RX ADMIN — MIDAZOLAM 1 MG: 1 INJECTION INTRAMUSCULAR; INTRAVENOUS at 08:38

## 2017-06-25 RX ADMIN — KETOROLAC TROMETHAMINE 30 MG: 30 INJECTION, SOLUTION INTRAMUSCULAR; INTRAVENOUS at 10:45

## 2017-06-25 RX ADMIN — FENTANYL CITRATE 150 MCG: 50 INJECTION, SOLUTION INTRAMUSCULAR; INTRAVENOUS at 10:39

## 2017-06-25 RX ADMIN — HYDROMORPHONE HYDROCHLORIDE 0.5 MG: 1 INJECTION, SOLUTION INTRAMUSCULAR; INTRAVENOUS; SUBCUTANEOUS at 03:44

## 2017-06-25 RX ADMIN — SODIUM CHLORIDE, POTASSIUM CHLORIDE, SODIUM LACTATE AND CALCIUM CHLORIDE 9 ML/HR: 600; 310; 30; 20 INJECTION, SOLUTION INTRAVENOUS at 11:15

## 2017-06-25 RX ADMIN — LISINOPRIL AND HYDROCHLOROTHIAZIDE 1 TABLET: 25; 20 TABLET ORAL at 17:52

## 2017-06-25 RX ADMIN — LIDOCAINE HYDROCHLORIDE 100 MG: 20 INJECTION, SOLUTION INFILTRATION; PERINEURAL at 09:25

## 2017-06-25 RX ADMIN — SODIUM CHLORIDE, POTASSIUM CHLORIDE, SODIUM LACTATE AND CALCIUM CHLORIDE 9 ML/HR: 600; 310; 30; 20 INJECTION, SOLUTION INTRAVENOUS at 08:39

## 2017-06-25 RX ADMIN — HYDROMORPHONE HYDROCHLORIDE 0.5 MG: 1 INJECTION, SOLUTION INTRAMUSCULAR; INTRAVENOUS; SUBCUTANEOUS at 06:14

## 2017-06-25 RX ADMIN — HYDROMORPHONE HYDROCHLORIDE 0.5 MG: 1 INJECTION, SOLUTION INTRAMUSCULAR; INTRAVENOUS; SUBCUTANEOUS at 13:04

## 2017-06-25 RX ADMIN — FENTANYL CITRATE 50 MCG: 50 INJECTION INTRAMUSCULAR; INTRAVENOUS at 08:39

## 2017-06-25 RX ADMIN — DEXAMETHASONE SODIUM PHOSPHATE 8 MG: 10 INJECTION INTRAMUSCULAR; INTRAVENOUS at 10:47

## 2017-06-25 RX ADMIN — POTASSIUM CHLORIDE, DEXTROSE MONOHYDRATE AND SODIUM CHLORIDE 100 ML/HR: 150; 5; 450 INJECTION, SOLUTION INTRAVENOUS at 01:23

## 2017-06-25 RX ADMIN — HYDROCODONE BITARTRATE AND ACETAMINOPHEN 1 TABLET: 7.5; 325 TABLET ORAL at 18:33

## 2017-06-25 RX ADMIN — HYDROMORPHONE HYDROCHLORIDE 0.5 MG: 1 INJECTION, SOLUTION INTRAMUSCULAR; INTRAVENOUS; SUBCUTANEOUS at 19:49

## 2017-06-25 RX ADMIN — FAMOTIDINE 20 MG: 10 INJECTION INTRAVENOUS at 08:38

## 2017-06-25 RX ADMIN — TAZOBACTAM SODIUM AND PIPERACILLIN SODIUM 3.38 G: 375; 3 INJECTION, SOLUTION INTRAVENOUS at 06:15

## 2017-06-25 RX ADMIN — HYDROMORPHONE HYDROCHLORIDE 0.5 MG: 1 INJECTION, SOLUTION INTRAMUSCULAR; INTRAVENOUS; SUBCUTANEOUS at 11:27

## 2017-06-25 RX ADMIN — LEVOTHYROXINE SODIUM 50 MCG: 50 TABLET ORAL at 07:23

## 2017-06-25 RX ADMIN — POTASSIUM CHLORIDE, DEXTROSE MONOHYDRATE AND SODIUM CHLORIDE 100 ML/HR: 150; 5; 450 INJECTION, SOLUTION INTRAVENOUS at 12:43

## 2017-06-25 RX ADMIN — SUCCINYLCHOLINE CHLORIDE 100 MG: 20 INJECTION, SOLUTION INTRAMUSCULAR; INTRAVENOUS; PARENTERAL at 09:25

## 2017-06-25 RX ADMIN — SUGAMMADEX 5 ML: 100 INJECTION, SOLUTION INTRAVENOUS at 10:41

## 2017-06-25 RX ADMIN — ONDANSETRON 4 MG: 2 INJECTION INTRAMUSCULAR; INTRAVENOUS at 10:46

## 2017-06-25 RX ADMIN — TAZOBACTAM SODIUM AND PIPERACILLIN SODIUM 3.38 G: 375; 3 INJECTION, SOLUTION INTRAVENOUS at 23:26

## 2017-06-25 RX ADMIN — ROCURONIUM BROMIDE 5 MG: 10 INJECTION INTRAVENOUS at 09:24

## 2017-06-25 RX ADMIN — TAZOBACTAM SODIUM AND PIPERACILLIN SODIUM 3.38 G: 375; 3 INJECTION, SOLUTION INTRAVENOUS at 14:55

## 2017-06-25 RX ADMIN — PROPOFOL 100 MG: 10 INJECTION, EMULSION INTRAVENOUS at 09:30

## 2017-06-25 RX ADMIN — FENTANYL CITRATE 100 MCG: 50 INJECTION, SOLUTION INTRAMUSCULAR; INTRAVENOUS at 09:23

## 2017-06-25 RX ADMIN — ROCURONIUM BROMIDE 35 MG: 10 INJECTION INTRAVENOUS at 09:35

## 2017-06-25 RX ADMIN — POTASSIUM CHLORIDE, DEXTROSE MONOHYDRATE AND SODIUM CHLORIDE 100 ML/HR: 150; 5; 450 INJECTION, SOLUTION INTRAVENOUS at 22:23

## 2017-06-25 RX ADMIN — SODIUM CHLORIDE, POTASSIUM CHLORIDE, SODIUM LACTATE AND CALCIUM CHLORIDE: 600; 310; 30; 20 INJECTION, SOLUTION INTRAVENOUS at 09:20

## 2017-06-25 RX ADMIN — METOPROLOL TARTRATE 12.5 MG: 25 TABLET ORAL at 08:40

## 2017-06-25 NOTE — ANESTHESIA POSTPROCEDURE EVALUATION
Patient: Jona Mahomod    Procedure Summary     Date Anesthesia Start Anesthesia Stop Room / Location    06/25/17 0920 1104  [facility] /  [facility]       Procedure Diagnosis Surgeon Provider    CHOLECYSTECTOMY LAPAROSCOPIC INTRAOPERATIVE CHOLANGIOGRAM (N/A Abdomen) No diagnosis on file. MD Juancho Joshi MD          Anesthesia Type: general  Last vitals  /83 (06/25/17 1125)    Temp 36.6 °C (97.9 °F) (06/25/17 1100)    Pulse 68 (06/25/17 1125)   Resp 12 (06/25/17 1125)    SpO2 100 % (06/25/17 1125)      Post Anesthesia Care and Evaluation    Patient location during evaluation: PACU  Patient participation: complete - patient participated  Level of consciousness: awake  Pain management: adequate  Airway patency: patent  Anesthetic complications: No anesthetic complications    Cardiovascular status: acceptable  Respiratory status: acceptable  Hydration status: acceptable

## 2017-06-25 NOTE — OP NOTE
Operative Note :   Roger Oconnor MD    Jona Mahmood  1965    Procedure Date: 06/25/17    Pre-op Diagnosis:  · Acute cholecystitis    Post-op Diagnosis:  · Same    Procedure:   · Laparoscopic cholecystectomy with cholangiogram    Surgeon: Roger Oconnor MD    Assistant: Tricia CONDON    Anesthesia:  General (general endotracheal tube)    Indications:  · 52-year-old gentleman with acute onset of right upper quadrant pain and ultrasound indicative of acute cholecystitis    Findings:   · Acute cholecystitis  · Unremarkable operative cholangiogram    Recommendations:   · Routine postoperative care    Description of procedure:    After obtaining informed consent, patient brought to the operating room and placed under general anesthetic.  Patient's abdomen was then sterilely prepped and draped.  I infiltrated the area above the umbilicus with a Marcaine solution.  A vertical incision was made above the umbilicus and carried through the skin and subcutaneous tissues until the fascia was encountered.  Fascia was incised and lifted and then #1 Vicryl sutures were placed on each side of the fascia in a U stitch pattern.  Peritoneum was then bluntly penetrated.  Starkey trocar was inserted and the abdomen was insufflated with CO2.  Cursory examination the abdomen was unremarkable.  5 mm trochars were placed, one in the epigastrium and 2 in the right upper quadrant.  Patient's position with head up and right side up.  Omentum was peeled off the edge of the liver and fundus of the gallbladder was exposed.  The gallbladder was grossly distended and inflamed and edematous.  Aspiration needle was used to drain the gallbladder and this allowed us to grasp it and lifted up.  Adhesions of the omentum were then taken down off the gallbladder until the infundibulum of the gallbladder was exposed.  This was then grasped and retracted laterally.  I began my dissection of the cystic triangle by incising the peritoneum overlying  the cystic triangle and then was able to identify the cystic duct as it came off the gallbladder itself.  I worked my way further back in the triangle and identified the cystic artery.  I then mobilized the lower third of the gallbladder off the bed of the liver until we had obtained the critical view of safety.  2 clips are placed proximally and one distally on the cystic artery.  A clip was placed on the gallbladder side of the gallbladder cystic duct junction.  I made an incision in the cystic duct and then introduced a cholangiocatheter through the abdominal wall and clipped it into place in the cystic duct.  We then shot our cholangiogram.  There was immediate good filling of the duodenum.  The distal bile duct had a smooth contour.  Did not appear to be enlarged.  You could see the cystic duct and common duct junction.  The proximal biliary radicals filled nicely.  No filling defects were appreciated.  The cholangiocatheter was then removed.  2 clips are placed on the common duct side of the cystic duct and the cystic duct was then divided between clips.  Cystic artery was divided between the previously placed clips.  The gallbladder was removed from the bed of the liver using electrocautery.  It was then placed in an Endo Catch bag and was removed.  The clips on the cystic duct and cystic artery stumps were inspected and appeared to be in good position.  No bleeding was seen from the liver bed.  The abdomen was then irrigated.  The 5 mm trochars were removed under direct visualization.  The abdomen was desufflated.  Umbilical fascia was reapproximated with #1 Vicryl suture.  Skin incisions were closed with 4-0 Monocryl.  Patient tolerated the procedure well.    Roger Oconnor MD  General and Endoscopic Surgery  [facility]    [address], [address]  [place], [place], [address]  P: [phone]  F: [phone]

## 2017-06-25 NOTE — PLAN OF CARE
Problem: Perioperative Period (Adult)  Goal: Signs and Symptoms of Listed Potential Problems Will be Absent or Manageable (Perioperative Period)  Outcome: Ongoing (interventions implemented as appropriate)    06/25/17 1059   Perioperative Period   Problems Assessed (Perioperative Period) pain   Problems Present (Perioperative Period) pain

## 2017-06-25 NOTE — H&P
"    Subjective:     Chief Complaint   Patient presents with   • Abdominal Pain     upper abdomen pain that started about 1600 without radiation. He describes the pain as a \"fullness and like someone is punching me\". Denies shortness of breath, dizziness, or nausea.           Jona Mahmood is a 52 y.o. male who Presented to the emergency department earlier this evening after having sudden onset of severe epigastric abdominal pain which radiated around his bilateral sides and into his back.  It was associated with some nausea.  Nothing seemed to trigger it.  The narcotic pain medicines have improved, although not resolved.  He had a similar episode to this around for 5 weeks ago which resolved on its own spontaneously.  In general he is able to eat what he wants and doesn't have any other complaints.    Past medical history is significant for diabetes and hypertension.  He had a laparoscopic left inguinal hernia repair done, but no other intra-abdominal surgery.      The following portions of the patient's history were reviewed and updated as appropriate: allergies, current medications, past family history, past medical history, past social history and past surgical history.    Past Medical History:   Diagnosis Date   • Diabetes mellitus    • Hyperglycemia    • Hypertension    • Low testosterone        Past Surgical History:   Procedure Laterality Date   • ANKLE SURGERY     • HERNIA REPAIR           Current Facility-Administered Medications:   •  dextrose (D50W) solution 25 g, 25 g, Intravenous, Q15 Min PRN, Roger Oconnor MD  •  dextrose (GLUTOSE) oral gel 15 g, 15 g, Oral, Q15 Min PRN, Roger Oconnor MD  •  dextrose 5 % and sodium chloride 0.45 % with KCl 20 mEq/L infusion, 100 mL/hr, Intravenous, Continuous, Roger Oconnor MD, Last Rate: 100 mL/hr at 06/25/17 0123, 100 mL/hr at 06/25/17 0123  •  glucagon (human recombinant) (GLUCAGEN DIAGNOSTIC) injection 1 mg, 1 mg, Subcutaneous, Q15 Min PRN, Roger PICKARD" MD Anastasia  •  HYDROmorphone (DILAUDID) injection 0.5 mg, 0.5 mg, Intravenous, Q2H PRN, Roger Oconnor MD, 0.5 mg at 06/25/17 0120  •  insulin regular (humuLIN R,novoLIN R) injection 0-7 Units, 0-7 Units, Subcutaneous, Q6H, Roger Oconnor MD  •  levothyroxine (SYNTHROID, LEVOTHROID) tablet 50 mcg, 50 mcg, Oral, QAM AC, Roger Oconnor MD  •  lisinopril-hydrochlorothiazide (PRINZIDE,ZESTORETIC) 20-25 MG per tablet 1 tablet, 1 tablet, Oral, Q24H, Roger Oconnor MD  •  metoprolol tartrate (LOPRESSOR) tablet 12.5 mg, 12.5 mg, Oral, BID, Roger Oconnor MD  •  ondansetron (ZOFRAN) injection 4 mg, 4 mg, Intravenous, Q6H PRN, Roger Oconnor MD  •  piperacillin-tazobactam (ZOSYN) 3.375 g in iso-osmotic dextrose 50 ml (premix), 3.375 g, Intravenous, Q8H, Roger Oconnor MD  •  sodium chloride 0.9 % flush 10 mL, 10 mL, Intravenous, PRN, Berlin Brandon MD    No Known Allergies    Family History   Problem Relation Age of Onset   • COPD Mother    • Diabetes Father    • Hypertension Father        Social History     Social History   • Marital status:      Spouse name: N/A   • Number of children: N/A   • Years of education: N/A     Occupational History   • Not on file.     Social History Main Topics   • Smoking status: Former Smoker   • Smokeless tobacco: Not on file      Comment: Quit 2000.   • Alcohol use No   • Drug use: Not on file   • Sexual activity: Not on file     Other Topics Concern   • Not on file     Social History Narrative   • No narrative on file         Review of Systems  A comprehensive review of systems was negative except for: Gastrointestinal: positive for abdominal pain and nausea  Endocrine: positive for diabetic symptoms including polydipsia and weight loss      Objective:     Vitals:    06/24/17 2342   BP: 164/83   Pulse: 60   Resp: 18   Temp: 99.1 °F (37.3 °C)   SpO2: 100%       PHYSICAL EXAM:  - Constitutional: Well-developed well-nourished, no acute distress  Height 75  inches  Weight 251 pounds    - Eyes: Conjunctiva normal, sclera nonicteric  - ENMT: Hearing grossly normal, oral mucosa moist  - Neck: Supple, no palpable mass,  trachea midline  - Respiratory: Clear to auscultation, normal inspiratory effort  - Cardiovascular: Regular rate, no peripheral edema, no jugular venous distention  - Gastrointestinal: Soft, moderate right upper quadrant and epigastric tenderness without guarding, no palpable mass, no hepatosplenomegaly, negative for hernia, bowel sounds normal  - Skin: Warm, dry  - Musculoskeletal: Symmetric strength, normal gait  - Psychiatric: Alert and oriented ×3, normal affect     Laboratory  Lab Results   Component Value Date    WBC 10.34 06/24/2017    RBC 6.26 (H) 06/24/2017    HGB 14.4 06/24/2017    HCT 43.5 06/24/2017     06/24/2017     No results found for: AMYLASE  Lab Results   Component Value Date    LIPASE 31 06/24/2017       Ultrasound of the gallbladder is reviewed by me.  There are multiple stones.  No significant wall thickening.  There is a small amount of pericholecystic fluid noted.  Assessment:     Acute cholecystitis     Plan:     Laparoscopic cholecystectomy with cholangiogram.  Risks and benefits discussed with patient.  He understands these risks include, but are not limited to, bleeding, infection, injury to intra-abdominal structures including the common bile duct, duodenum, stomach: Pancreas small intestine and other potential problems and he agrees to proceed.    Roger Oconnor MD  General and Endoscopic Surgery  [facility]    [address], [address]  [place], [place], [address]  P: [phone]  F: [phone]

## 2017-06-25 NOTE — ANESTHESIA PREPROCEDURE EVALUATION
Anesthesia Evaluation     Patient summary reviewed and Nursing notes reviewed   NPO Solid Status: > 8 hours       Airway   Mallampati: II  TM distance: >3 FB  Neck ROM: full  Dental - normal exam     Pulmonary - normal exam    breath sounds clear to auscultation  (+) a smoker Former,   Cardiovascular - normal exam    ECG reviewed  Patient on routine beta blocker  Rhythm: regular  Rate: normal    (+) hypertension, hyperlipidemia  (-) angina, orthopnea, PND, MACKAY      Neuro/Psych- negative ROS  GI/Hepatic/Renal/Endo    (+) obesity,  diabetes mellitus type 2 poorly controlled, hypothyroidism,     Musculoskeletal (-) negative ROS    Abdominal  - normal exam    Bowel sounds: normal.   Substance History - negative use     OB/GYN negative ob/gyn ROS         Other - negative ROS                                       Anesthesia Plan    ASA 3     general     intravenous induction   Anesthetic plan and risks discussed with patient.

## 2017-06-25 NOTE — ANESTHESIA PROCEDURE NOTES
Airway  Urgency: elective    Date/Time: 6/25/2017 9:28 AM  End Time:6/25/2017 9:30 AM    General Information and Staff    Patient location during procedure: OR  Anesthesiologist: SUDHEER MCKEON    Indications and Patient Condition  Indications for airway management: airway protection    Preoxygenated: yes  Mask difficulty assessment: 1 - vent by mask    Final Airway Details  Final airway type: endotracheal airway      Successful airway: ETT  Cuffed: yes   Successful intubation technique: direct laryngoscopy  Facilitating devices/methods: Bougie  Endotracheal tube insertion site: oral  Blade: Barrett  Blade size: #2  ETT size: 8.0 mm  Cormack-Lehane Classification: grade IIa - partial view of glottis  Placement verified by: chest auscultation and capnometry   Number of attempts at approach: 2

## 2017-06-26 VITALS
HEIGHT: 75 IN | WEIGHT: 251 LBS | BODY MASS INDEX: 31.21 KG/M2 | OXYGEN SATURATION: 100 % | HEART RATE: 76 BPM | TEMPERATURE: 98.3 F | DIASTOLIC BLOOD PRESSURE: 67 MMHG | SYSTOLIC BLOOD PRESSURE: 120 MMHG | RESPIRATION RATE: 16 BRPM

## 2017-06-26 LAB
ALBUMIN SERPL-MCNC: 3.3 G/DL (ref 3.5–5.2)
ALBUMIN/GLOB SERPL: 1.1 G/DL
ALP SERPL-CCNC: 67 U/L (ref 39–117)
ALT SERPL W P-5'-P-CCNC: 73 U/L (ref 1–41)
ANION GAP SERPL CALCULATED.3IONS-SCNC: 11.8 MMOL/L
AST SERPL-CCNC: 39 U/L (ref 1–40)
BASOPHILS # BLD AUTO: 0.01 10*3/MM3 (ref 0–0.2)
BASOPHILS NFR BLD AUTO: 0.1 % (ref 0–1.5)
BILIRUB SERPL-MCNC: 0.8 MG/DL (ref 0.1–1.2)
BUN BLD-MCNC: 8 MG/DL (ref 6–20)
BUN/CREAT SERPL: 9.4 (ref 7–25)
CALCIUM SPEC-SCNC: 8.5 MG/DL (ref 8.6–10.5)
CHLORIDE SERPL-SCNC: 98 MMOL/L (ref 98–107)
CO2 SERPL-SCNC: 27.2 MMOL/L (ref 22–29)
CREAT BLD-MCNC: 0.85 MG/DL (ref 0.76–1.27)
DEPRECATED RDW RBC AUTO: 39.6 FL (ref 37–54)
EOSINOPHIL # BLD AUTO: 0.01 10*3/MM3 (ref 0–0.7)
EOSINOPHIL NFR BLD AUTO: 0.1 % (ref 0.3–6.2)
ERYTHROCYTE [DISTWIDTH] IN BLOOD BY AUTOMATED COUNT: 15.5 % (ref 11.5–14.5)
GFR SERPL CREATININE-BSD FRML MDRD: 95 ML/MIN/1.73
GLOBULIN UR ELPH-MCNC: 2.9 GM/DL
GLUCOSE BLD-MCNC: 176 MG/DL (ref 65–99)
GLUCOSE BLDC GLUCOMTR-MCNC: 138 MG/DL (ref 70–130)
HCT VFR BLD AUTO: 38.3 % (ref 40.4–52.2)
HGB BLD-MCNC: 12.4 G/DL (ref 13.7–17.6)
IMM GRANULOCYTES # BLD: 0.02 10*3/MM3 (ref 0–0.03)
IMM GRANULOCYTES NFR BLD: 0.2 % (ref 0–0.5)
LYMPHOCYTES # BLD AUTO: 1.65 10*3/MM3 (ref 0.9–4.8)
LYMPHOCYTES NFR BLD AUTO: 15.5 % (ref 19.6–45.3)
MCH RBC QN AUTO: 22.8 PG (ref 27–32.7)
MCHC RBC AUTO-ENTMCNC: 32.4 G/DL (ref 32.6–36.4)
MCV RBC AUTO: 70.5 FL (ref 79.8–96.2)
MONOCYTES # BLD AUTO: 0.83 10*3/MM3 (ref 0.2–1.2)
MONOCYTES NFR BLD AUTO: 7.8 % (ref 5–12)
NEUTROPHILS # BLD AUTO: 8.1 10*3/MM3 (ref 1.9–8.1)
NEUTROPHILS NFR BLD AUTO: 76.3 % (ref 42.7–76)
PLATELET # BLD AUTO: 155 10*3/MM3 (ref 140–500)
PMV BLD AUTO: 10.1 FL (ref 6–12)
POTASSIUM BLD-SCNC: 4.1 MMOL/L (ref 3.5–5.2)
PROT SERPL-MCNC: 6.2 G/DL (ref 6–8.5)
RBC # BLD AUTO: 5.43 10*6/MM3 (ref 4.6–6)
SODIUM BLD-SCNC: 137 MMOL/L (ref 136–145)
WBC NRBC COR # BLD: 10.62 10*3/MM3 (ref 4.5–10.7)

## 2017-06-26 PROCEDURE — 82962 GLUCOSE BLOOD TEST: CPT

## 2017-06-26 PROCEDURE — 25010000002 PIPERACILLIN SOD-TAZOBACTAM PER 1 G: Performed by: SURGERY

## 2017-06-26 PROCEDURE — 85025 COMPLETE CBC W/AUTO DIFF WBC: CPT | Performed by: SURGERY

## 2017-06-26 PROCEDURE — 80053 COMPREHEN METABOLIC PANEL: CPT | Performed by: SURGERY

## 2017-06-26 PROCEDURE — G0378 HOSPITAL OBSERVATION PER HR: HCPCS

## 2017-06-26 RX ORDER — HYDROCODONE BITARTRATE AND ACETAMINOPHEN 7.5; 325 MG/1; MG/1
1 TABLET ORAL EVERY 6 HOURS PRN
Qty: 24 TABLET | Refills: 0 | Status: SHIPPED | OUTPATIENT
Start: 2017-06-26 | End: 2017-07-05

## 2017-06-26 RX ADMIN — METOPROLOL TARTRATE 12.5 MG: 25 TABLET ORAL at 08:33

## 2017-06-26 RX ADMIN — LEVOTHYROXINE SODIUM 50 MCG: 50 TABLET ORAL at 06:44

## 2017-06-26 RX ADMIN — HYDROCODONE BITARTRATE AND ACETAMINOPHEN 1 TABLET: 7.5; 325 TABLET ORAL at 08:43

## 2017-06-26 RX ADMIN — TAZOBACTAM SODIUM AND PIPERACILLIN SODIUM 3.38 G: 375; 3 INJECTION, SOLUTION INTRAVENOUS at 06:44

## 2017-06-26 RX ADMIN — POTASSIUM CHLORIDE, DEXTROSE MONOHYDRATE AND SODIUM CHLORIDE 100 ML/HR: 150; 5; 450 INJECTION, SOLUTION INTRAVENOUS at 08:33

## 2017-06-26 RX ADMIN — HYDROCODONE BITARTRATE AND ACETAMINOPHEN 1 TABLET: 7.5; 325 TABLET ORAL at 02:39

## 2017-06-26 NOTE — DISCHARGE SUMMARY
Discharge Summary    Patient name: Jona Mahmood    Medical record number: [id number]    Admission date: 6/24/2017  Discharge date:  6/26/17    Attending physician: Roger Oconnor MD    Primary care physician: Elias Almendarez MD    Referring physician: Roger Oconnor MD  [address] [address]    Consulting physician(s): None   Condition on discharge: improving    Primary Diagnoses: Acute cholecystitis    Secondary Diagnoses:     Operative Procedure:     Hospital Course: The patient is a  52 y.o. male that was admitted to the hospital with acute cholecystitis.  Patient was admitted and placed on antibiotics.  He was brought for cholecystectomy the next day.  The operation was uneventful and by postoperative day 1 the patient was feeling quite well, tolerating a regular diet and ready for discharge home.    Discharge medications:    Jona Mahmood Medication Instructions DOROTA:[id number]    Printed on:06/26/17 4735   Medication Information                      amLODIPine (NORVASC) 5 MG tablet  Take 1 tablet by mouth Daily.             atorvastatin (LIPITOR) 10 MG tablet  Take 1 tablet by mouth daily.             Dulaglutide 1.5 MG/0.5ML solution pen-injector  Inject 1.5 mg under the skin 1 (One) Time Per Week.             FLUVIRIN 0.5 ML suspension prefilled syringe injection               HYDROcodone-acetaminophen (NORCO) 7.5-325 MG per tablet  Take 1 tablet by mouth Every 6 (Six) Hours As Needed for Moderate Pain (4-6) for up to 9 days.             hydrOXYzine (ATARAX) 25 MG tablet  Take 1 tablet by mouth every 6 (six) hours as needed for itching.             levothyroxine (SYNTHROID, LEVOTHROID) 50 MCG tablet  Take 50 mcg by mouth Every Morning Before Breakfast.             lisinopril-hydrochlorothiazide (PRINZIDE,ZESTORETIC) 20-25 MG per tablet  TAKE ONE TABLET BY MOUTH ONCE DAILY             metFORMIN ER (GLUCOPHAGE-XR) 500 MG 24 hr tablet  Take 500 mg by mouth Daily With  Breakfast & Dinner.             metoprolol tartrate (LOPRESSOR) 25 MG tablet  Take 12.5 mg by mouth 2 (Two) Times a Day.             SITagliptin-MetFORMIN HCl ER (JANUMET XR) 100-1000 MG tablet sustained-release 24 hour  one tablet daily             Testosterone (ANDROGEL PUMP) 20.25 MG/ACT (1.62%) gel  APPLY FOUR PUMPS TO SKIN ONCE DAILY                 Discharge instructions:  No lifting over 10 pounds until released by me.  May shower tomorrow.  Diet as tolerated.      Follow-up appointment: Follow up with Roger Oconnor MD in the office in 2 weeks. Call for appointment at [phone].    Roger Oconnor MD  General and Endoscopic Surgery  [facility]    [address], [address]  [place], [place], [address]  P: [phone]  F: [phone]

## 2017-06-26 NOTE — PLAN OF CARE
Problem: Patient Care Overview (Adult)  Goal: Plan of Care Review  Outcome: Ongoing (interventions implemented as appropriate)    06/26/17 0455   Coping/Psychosocial Response Interventions   Plan Of Care Reviewed With patient   Patient Care Overview   Progress improving   Outcome Evaluation   Outcome Summary/Follow up Plan Norco for pain control - encouraged patient to take pain medication before pain gets past 5; advance diet to Consistent Carb for breakfast; probable discharge on 6/26         Problem: Pain, Acute (Adult)  Goal: Acceptable Pain Control/Comfort Level  Outcome: Ongoing (interventions implemented as appropriate)    Problem: Perioperative Period (Adult)  Goal: Signs and Symptoms of Listed Potential Problems Will be Absent or Manageable (Perioperative Period)  Outcome: Ongoing (interventions implemented as appropriate)

## 2017-06-28 LAB
CYTO UR: NORMAL
LAB AP CASE REPORT: NORMAL
Lab: NORMAL
PATH REPORT.FINAL DX SPEC: NORMAL
PATH REPORT.GROSS SPEC: NORMAL

## 2017-07-10 ENCOUNTER — OFFICE VISIT (OUTPATIENT)
Dept: SURGERY | Facility: CLINIC | Age: 52
End: 2017-07-10

## 2017-07-10 DIAGNOSIS — K81.0 ACUTE CHOLECYSTITIS: Primary | ICD-10-CM

## 2017-07-10 PROCEDURE — 99024 POSTOP FOLLOW-UP VISIT: CPT | Performed by: SURGERY

## 2017-07-10 NOTE — PROGRESS NOTES
Follow-up cholecystectomy for acute cholecystitis    Subjective:  Patient feels quite well.  He is 15 days out now.  Denies any pain or nausea.  Eating well.    Objective:  Abdomen: Soft, benign, incisions nicely healed    Pathology reviewed with patient and demonstrates evidence for acute cholecystitis with some necrosis    Assessment and plan:  Status post cholecystectomy for acute cholecystitis  He has recovered well.  I am letting him return to his normal activities.  Follow up with me as needed.    Roger Oconnor MD  General and Endoscopic Surgery  [facility]    [address], [address]  [place], [place], [address]  P: [phone]  F: [phone]

## 2017-08-15 RX ORDER — TESTOSTERONE 16.2 MG/G
GEL TRANSDERMAL
Qty: 150 G | Refills: 0 | Status: SHIPPED | OUTPATIENT
Start: 2017-08-15

## 2017-08-17 ENCOUNTER — TELEPHONE (OUTPATIENT)
Dept: FAMILY MEDICINE CLINIC | Facility: CLINIC | Age: 52
End: 2017-08-17

## 2017-08-17 NOTE — TELEPHONE ENCOUNTER
Spoke to patient's wife and informed her that the prescription was written for 150 g which is a months supply for the patient   ----- Message from Jeanette Sagastume MA sent at 8/17/2017 10:42 AM EDT -----  Contact: WIFE  PT WIFE CALLED TO SEE IF PRESCRIPTION FOR ANDROGEL WAS READY ADVISED YES SHE SAID THAT ONE PUMP ONLY LASTS 15 DAYS AND SHE NEEDS 2 PUMPS/ SHE CAN BE REACHED -[phone]

## 2017-08-29 DIAGNOSIS — E78.00 HYPERCHOLESTEROLEMIA: ICD-10-CM

## 2017-08-29 DIAGNOSIS — I10 BENIGN ESSENTIAL HYPERTENSION: Primary | ICD-10-CM

## 2017-08-29 DIAGNOSIS — E11.9 TYPE 2 DIABETES MELLITUS WITHOUT COMPLICATION, WITHOUT LONG-TERM CURRENT USE OF INSULIN (HCC): ICD-10-CM

## 2017-08-29 DIAGNOSIS — E29.1 HYPOGONADISM MALE: ICD-10-CM

## 2021-03-26 ENCOUNTER — BULK ORDERING (OUTPATIENT)
Dept: CASE MANAGEMENT | Facility: OTHER | Age: 56
End: 2021-03-26

## 2021-03-26 DIAGNOSIS — Z23 IMMUNIZATION DUE: ICD-10-CM

## (undated) DEVICE — SUT MNCRYL 4/0 PS2 18 IN

## (undated) DEVICE — VIOLET BRAIDED (POLYGLACTIN 910), SYNTHETIC ABSORBABLE SUTURE: Brand: COATED VICRYL

## (undated) DEVICE — LOU LAP CHOLE: Brand: MEDLINE INDUSTRIES, INC.

## (undated) DEVICE — CATH CHOLANG 4.5F18IN BRGNDY

## (undated) DEVICE — LAPAROSCOPIC GAS CONDITIONING DEVICE.: Brand: INSUFLOW

## (undated) DEVICE — CATH IV INSYTE AUTOGARD 14G 1 1/2IN ORNG

## (undated) DEVICE — ENDOPATH XCEL BLADELESS TROCARS WITH STABILITY SLEEVES: Brand: ENDOPATH XCEL

## (undated) DEVICE — ENDOPATH XCEL UNIVERSAL TROCAR STABLILITY SLEEVES: Brand: ENDOPATH XCEL

## (undated) DEVICE — SOL NACL 0.9PCT 1000ML

## (undated) DEVICE — ENDOCUT SCISSOR TIP, DISPOSABLE: Brand: RENEW

## (undated) DEVICE — ENDOPATH XCEL BLUNT TIP TROCARS WITH SMOOTH SLEEVES: Brand: ENDOPATH XCEL

## (undated) DEVICE — 3M™ STERI-STRIP™ REINFORCED ADHESIVE SKIN CLOSURES, R1547, 1/2 IN X 4 IN (12 MM X 100 MM), 6 STRIPS/ENVELOPE: Brand: 3M™ STERI-STRIP™

## (undated) DEVICE — PENCL E/S HNDSWCH ROCKR CB

## (undated) DEVICE — KTTNER ENDO BLNT DISSCT

## (undated) DEVICE — STPCK 3WY D201 DISCOFIX

## (undated) DEVICE — CONTAINER,SPECIMEN,OR STERILE,4OZ: Brand: MEDLINE

## (undated) DEVICE — APPL CHLORAPREP W/TINT 26ML ORNG

## (undated) DEVICE — EXTENSION SET, MALE LUER LOCK ADAPTER WITH RETRACTABLE COLLAR

## (undated) DEVICE — VISUALIZATION SYSTEM: Brand: CLEARIFY

## (undated) DEVICE — DRP C/ARM 41X74IN

## (undated) DEVICE — DRAPE,REIN 53X77,STERILE: Brand: MEDLINE

## (undated) DEVICE — GLV SURG BIOGEL LTX PF 8 1/2

## (undated) DEVICE — ENDOPOUCH RETRIEVER SPECIMEN RETRIEVAL BAGS: Brand: ENDOPOUCH RETRIEVER